# Patient Record
Sex: MALE | URBAN - METROPOLITAN AREA
[De-identification: names, ages, dates, MRNs, and addresses within clinical notes are randomized per-mention and may not be internally consistent; named-entity substitution may affect disease eponyms.]

---

## 2019-01-31 ENCOUNTER — OFFICE VISIT (OUTPATIENT)
Dept: URBAN - METROPOLITAN AREA CLINIC 40 | Facility: CLINIC | Age: 69
End: 2019-01-31
Payer: COMMERCIAL

## 2019-01-31 DIAGNOSIS — H43.813 BILATERAL VITREOUS DETACHMENT OF EYES: ICD-10-CM

## 2019-01-31 DIAGNOSIS — H25.813 COMBINED FORMS OF AGE-RELATED CATARACT, BILATERAL: Primary | ICD-10-CM

## 2019-01-31 PROCEDURE — 99204 OFFICE O/P NEW MOD 45 MIN: CPT | Performed by: OPHTHALMOLOGY

## 2019-01-31 RX ORDER — DUREZOL 0.5 MG/ML
0.05 % EMULSION OPHTHALMIC
Qty: 5 | Refills: 1 | Status: INACTIVE
Start: 2019-01-31 | End: 2019-02-27

## 2019-01-31 RX ORDER — BESIFLOXACIN 6 MG/ML
0.6 % SUSPENSION OPHTHALMIC
Qty: 5 | Refills: 1 | Status: INACTIVE
Start: 2019-01-31 | End: 2019-02-08

## 2019-01-31 ASSESSMENT — INTRAOCULAR PRESSURE
OS: 18
OD: 18

## 2019-01-31 ASSESSMENT — VISUAL ACUITY
OS: 20/20
OD: 20/25

## 2019-01-31 ASSESSMENT — KERATOMETRY
OS: 44.13
OD: 44.00

## 2019-01-31 NOTE — IMPRESSION/PLAN
Impression: Bilateral vitreous detachment of eyes: H43.813. Condition: new prob, no addtl w/u needed. Plan: Posterior vitreous detachment accounts for the patient's complaints. There is no evidence of retinal pathology. All signs and risks of retinal detachment and tears were discussed in detail. Patient instructed to call the office immediately if any symptoms noted. Recommend the patient return to office for follow up.

## 2019-09-03 ENCOUNTER — OFFICE VISIT (OUTPATIENT)
Dept: URBAN - METROPOLITAN AREA CLINIC 40 | Facility: CLINIC | Age: 69
End: 2019-09-03
Payer: COMMERCIAL

## 2019-09-03 PROCEDURE — 99214 OFFICE O/P EST MOD 30 MIN: CPT | Performed by: OPHTHALMOLOGY

## 2019-09-03 ASSESSMENT — VISUAL ACUITY
OS: 20/20
OD: 20/30

## 2019-09-03 ASSESSMENT — INTRAOCULAR PRESSURE
OS: 16
OD: 16

## 2019-09-03 NOTE — IMPRESSION/PLAN
Impression: Combined forms of age-related cataract, bilateral: H25.813. .  Visually significant, quality of life issue, could improve with surgery. Plan: Cataracts account for the patient's complaints. Discussed all risks, benefits, alternatives, procedures and recovery. Patient understands changing glasses will not improve vision. Patient desires to have surgery, recommend phacoemulsification with intraocular lens implant OD.  lvl 2 - pt wants toric IOL -  Re-evaluate OS for possible cataract surgery after OD is done.

## 2019-09-27 ENCOUNTER — PRE-OPERATIVE VISIT (OUTPATIENT)
Dept: URBAN - METROPOLITAN AREA CLINIC 23 | Facility: CLINIC | Age: 69
End: 2019-09-27
Payer: COMMERCIAL

## 2019-09-27 PROCEDURE — 92136 OPHTHALMIC BIOMETRY: CPT | Performed by: OPHTHALMOLOGY

## 2019-09-27 ASSESSMENT — PACHYMETRY
OS: 3.57
OD: 3.51
OS: 27.22
OD: 27.49

## 2019-09-30 ENCOUNTER — SURGERY (OUTPATIENT)
Dept: URBAN - METROPOLITAN AREA SURGERY 11 | Facility: SURGERY | Age: 69
End: 2019-09-30
Payer: COMMERCIAL

## 2019-09-30 PROCEDURE — 66984 XCAPSL CTRC RMVL W/O ECP: CPT | Performed by: OPHTHALMOLOGY

## 2019-10-01 ENCOUNTER — POST-OPERATIVE VISIT (OUTPATIENT)
Dept: URBAN - METROPOLITAN AREA CLINIC 40 | Facility: CLINIC | Age: 69
End: 2019-10-01

## 2019-10-01 DIAGNOSIS — Z09 ENCNTR FOR F/U EXAM AFT TRTMT FOR COND OTH THAN MALIG NEOPLM: Primary | ICD-10-CM

## 2019-10-01 ASSESSMENT — INTRAOCULAR PRESSURE: OD: 22

## 2019-10-08 ENCOUNTER — POST-OPERATIVE VISIT (OUTPATIENT)
Dept: URBAN - METROPOLITAN AREA CLINIC 40 | Facility: CLINIC | Age: 69
End: 2019-10-08

## 2019-10-08 ASSESSMENT — INTRAOCULAR PRESSURE: OD: 19

## 2019-10-21 ENCOUNTER — SURGERY (OUTPATIENT)
Dept: URBAN - METROPOLITAN AREA SURGERY 11 | Facility: SURGERY | Age: 69
End: 2019-10-21
Payer: COMMERCIAL

## 2019-10-21 PROCEDURE — 66984 XCAPSL CTRC RMVL W/O ECP: CPT | Performed by: OPHTHALMOLOGY

## 2019-10-22 ENCOUNTER — POST-OPERATIVE VISIT (OUTPATIENT)
Dept: URBAN - METROPOLITAN AREA CLINIC 23 | Facility: CLINIC | Age: 69
End: 2019-10-22

## 2019-10-22 ASSESSMENT — INTRAOCULAR PRESSURE
OD: 16
OS: 20

## 2019-11-05 ENCOUNTER — POST-OPERATIVE VISIT (OUTPATIENT)
Dept: URBAN - METROPOLITAN AREA CLINIC 40 | Facility: CLINIC | Age: 69
End: 2019-11-05

## 2019-11-05 ASSESSMENT — INTRAOCULAR PRESSURE
OS: 13
OD: 13

## 2019-11-19 ENCOUNTER — POST-OPERATIVE VISIT (OUTPATIENT)
Dept: URBAN - METROPOLITAN AREA CLINIC 40 | Facility: CLINIC | Age: 69
End: 2019-11-19

## 2019-11-19 ASSESSMENT — INTRAOCULAR PRESSURE
OS: 15
OD: 14

## 2020-03-04 ENCOUNTER — OFFICE VISIT (OUTPATIENT)
Dept: URBAN - METROPOLITAN AREA CLINIC 40 | Facility: CLINIC | Age: 70
End: 2020-03-04
Payer: COMMERCIAL

## 2020-03-04 PROCEDURE — 92014 COMPRE OPH EXAM EST PT 1/>: CPT | Performed by: OPTOMETRIST

## 2020-03-04 ASSESSMENT — INTRAOCULAR PRESSURE
OS: 16
OD: 16

## 2021-04-07 ENCOUNTER — OFFICE VISIT (OUTPATIENT)
Dept: URBAN - METROPOLITAN AREA CLINIC 40 | Facility: CLINIC | Age: 71
End: 2021-04-07
Payer: COMMERCIAL

## 2021-04-07 DIAGNOSIS — H26.493 OTHER SECONDARY CATARACT, BILATERAL: Primary | ICD-10-CM

## 2021-04-07 PROCEDURE — 99214 OFFICE O/P EST MOD 30 MIN: CPT | Performed by: OPTOMETRIST

## 2021-04-07 ASSESSMENT — VISUAL ACUITY
OD: 20/20
OS: 20/20

## 2021-04-07 ASSESSMENT — INTRAOCULAR PRESSURE
OS: 13
OD: 14

## 2022-03-04 ENCOUNTER — OFFICE VISIT (OUTPATIENT)
Dept: URBAN - METROPOLITAN AREA CLINIC 41 | Facility: CLINIC | Age: 72
End: 2022-03-04
Payer: COMMERCIAL

## 2022-03-04 DIAGNOSIS — Z96.1 PRESENCE OF INTRAOCULAR LENS: ICD-10-CM

## 2022-03-04 PROCEDURE — 99204 OFFICE O/P NEW MOD 45 MIN: CPT | Performed by: OPHTHALMOLOGY

## 2022-03-04 PROCEDURE — 92134 CPTRZ OPH DX IMG PST SGM RTA: CPT | Performed by: OPHTHALMOLOGY

## 2022-03-04 RX ORDER — OFLOXACIN 3 MG/ML
0.3 % SOLUTION/ DROPS OPHTHALMIC
Qty: 5 | Refills: 0 | Status: INACTIVE
Start: 2022-03-04 | End: 2022-03-25

## 2022-03-04 RX ORDER — PREDNISOLONE ACETATE 10 MG/ML
1 % SUSPENSION/ DROPS OPHTHALMIC
Qty: 5 | Refills: 3 | Status: INACTIVE
Start: 2022-03-04 | End: 2022-03-25

## 2022-03-04 ASSESSMENT — INTRAOCULAR PRESSURE
OS: 8
OD: 9

## 2022-03-04 NOTE — IMPRESSION/PLAN
Impression: Other vitreous opacities, bilateral: H43.393. Plan: Exam and OCT demonstrates vitreous opacities that are hindering the patient's ADLs. The risks and benefits of PPV versus observation were discussed at length. Given the patient's current visual acuity and hindrance on activities of daily living, the patient elects to proceed with surgery.   

Surgery 25g PPV/posterior capsulotomy/poss EL Left eye detailed exam

## 2022-03-04 NOTE — IMPRESSION/PLAN
Impression: Vitreous degeneration, bilateral: H43.813. Plan: Patient notes floaters. Exam demonstrates a PVD without any RD or RT on exam.  Discussed R/B/A of PPV vs observation for the floaters. Reassurance provided. RDW discussed. Precautions discussed with the patient.

## 2022-03-04 NOTE — IMPRESSION/PLAN
Impression: Presence of intraocular lens: Z96.1.  Plan: Looks great. + PCO; will proceed with capsulotomy at the time of PPV

## 2022-03-08 ENCOUNTER — Encounter (OUTPATIENT)
Dept: URBAN - METROPOLITAN AREA EXTERNAL CLINIC 14 | Facility: EXTERNAL CLINIC | Age: 72
End: 2022-03-08
Payer: COMMERCIAL

## 2022-03-08 PROCEDURE — 67039 LASER TREATMENT OF RETINA: CPT | Performed by: OPHTHALMOLOGY

## 2022-03-09 ENCOUNTER — POST-OPERATIVE VISIT (OUTPATIENT)
Dept: URBAN - METROPOLITAN AREA CLINIC 41 | Facility: CLINIC | Age: 72
End: 2022-03-09
Payer: MEDICARE

## 2022-03-09 DIAGNOSIS — H43.393 OTHER VITREOUS OPACITIES, BILATERAL: Primary | ICD-10-CM

## 2022-03-09 PROCEDURE — 99024 POSTOP FOLLOW-UP VISIT: CPT | Performed by: OPHTHALMOLOGY

## 2022-03-09 ASSESSMENT — INTRAOCULAR PRESSURE
OS: 9
OD: 13

## 2022-03-09 NOTE — IMPRESSION/PLAN
Impression: S/P 25G PPV/POSTERIOR CAPSULOTOMY/POSSIBLE EL OS - 1 Day. Other vitreous opacities, bilateral  H43.393. Plan: Excellent post op course. Post operative instructions reviewed. Condition is improving. No signs or symptoms of infection.  IOP normal.

RTC: 1-2 week POS OS, OCT OU, Dilate OU as he may want to discuss tx for floaters OD

## 2022-03-25 ENCOUNTER — POST-OPERATIVE VISIT (OUTPATIENT)
Dept: URBAN - METROPOLITAN AREA CLINIC 41 | Facility: CLINIC | Age: 72
End: 2022-03-25
Payer: MEDICARE

## 2022-03-25 PROCEDURE — 99024 POSTOP FOLLOW-UP VISIT: CPT | Performed by: OPHTHALMOLOGY

## 2022-03-25 RX ORDER — PREDNISOLONE ACETATE 10 MG/ML
1 % SUSPENSION/ DROPS OPHTHALMIC
Qty: 5 | Refills: 3 | Status: INACTIVE
Start: 2022-03-25 | End: 2022-06-22

## 2022-03-25 RX ORDER — OFLOXACIN 3 MG/ML
0.3 % SOLUTION/ DROPS OPHTHALMIC
Qty: 5 | Refills: 0 | Status: INACTIVE
Start: 2022-03-25 | End: 2022-04-23

## 2022-03-25 ASSESSMENT — INTRAOCULAR PRESSURE
OD: 12
OS: 15

## 2022-03-25 NOTE — IMPRESSION/PLAN
Impression: 1) S/P 25G PPV/POSTERIOR CAPSULOTOMY/EL OS - 17 Days. Other vitreous opacities, bilateral  H43.393.
2) Other vitreous opacities OD- Plan: 1) Excellent post op course. Post operative instructions reviewed. Condition is improving. 2) Patient notes that opacities OD are visually significant. Discussed surgery vs observation. Patient elects to proceed with vitrectomy surgery OD.  

RTC 25gPPV/poss EL OD x vit opacities

## 2022-03-29 ENCOUNTER — Encounter (OUTPATIENT)
Dept: URBAN - METROPOLITAN AREA EXTERNAL CLINIC 14 | Facility: EXTERNAL CLINIC | Age: 72
End: 2022-03-29
Payer: COMMERCIAL

## 2022-03-29 PROCEDURE — 67036 REMOVAL OF INNER EYE FLUID: CPT | Performed by: OPHTHALMOLOGY

## 2022-03-30 ENCOUNTER — POST-OPERATIVE VISIT (OUTPATIENT)
Dept: URBAN - METROPOLITAN AREA CLINIC 41 | Facility: CLINIC | Age: 72
End: 2022-03-30
Payer: MEDICARE

## 2022-03-30 PROCEDURE — 99024 POSTOP FOLLOW-UP VISIT: CPT | Performed by: OPHTHALMOLOGY

## 2022-03-30 ASSESSMENT — INTRAOCULAR PRESSURE
OD: 11
OS: 18

## 2022-03-30 NOTE — IMPRESSION/PLAN
Impression: S/P Status post 25G PPV/POSTERIOR CAPSULOTOMY/EL OD - 1 Day. Other vitreous opacities, bilateral  H43.393.  Excellent post op course   Post operative instructions reviewed - Condition is improving - Plan: --retina attached
--no s/s infection
--IOP acceptable
--start PF/Oflox QID
--RD/endoph WS discussed
--f/u 1 week PO OD

## 2022-04-04 ENCOUNTER — POST-OPERATIVE VISIT (OUTPATIENT)
Dept: URBAN - METROPOLITAN AREA CLINIC 41 | Facility: CLINIC | Age: 72
End: 2022-04-04
Payer: MEDICARE

## 2022-04-04 PROCEDURE — 99024 POSTOP FOLLOW-UP VISIT: CPT | Performed by: OPHTHALMOLOGY

## 2022-04-04 ASSESSMENT — INTRAOCULAR PRESSURE
OD: 19
OS: 15

## 2022-04-04 NOTE — IMPRESSION/PLAN
Impression: S/P 25G PPV/POSTERIOR CAPSULOTOMY/EL OD - 6 Days. Other vitreous opacities, bilateral  H43.393. Plan: --Excellent post op course
--Condition is improving 
--Taper Prednisolone acetate 1% TID x 1 wk, BID x 1wk, QD x 1wk, then stop
--Discontinue Ofloxacin 0.3% RTC: 3 mo with OCT OU

## 2022-06-27 ENCOUNTER — POST-OPERATIVE VISIT (OUTPATIENT)
Dept: URBAN - METROPOLITAN AREA CLINIC 41 | Facility: CLINIC | Age: 72
End: 2022-06-27
Payer: MEDICARE

## 2022-06-27 DIAGNOSIS — H43.393 OTHER VITREOUS OPACITIES, BILATERAL: Primary | ICD-10-CM

## 2022-06-27 PROCEDURE — 92134 CPTRZ OPH DX IMG PST SGM RTA: CPT | Performed by: OPHTHALMOLOGY

## 2022-06-27 PROCEDURE — 99024 POSTOP FOLLOW-UP VISIT: CPT | Performed by: OPHTHALMOLOGY

## 2022-06-27 ASSESSMENT — INTRAOCULAR PRESSURE
OS: 15
OD: 15

## 2022-06-27 NOTE — IMPRESSION/PLAN
Impression: S/P 25G PPV/POSTERIOR CAPSULOTOMY/EL OD - 90 Days. Other vitreous opacities, bilateral  H43.393. Plan: --Excellent post op course   
--Post operative instructions reviewed 
--Condition is improving . RTC: 12 mo with OCT OU

## 2023-06-28 ENCOUNTER — OFFICE VISIT (OUTPATIENT)
Facility: LOCATION | Age: 73
End: 2023-06-28
Payer: COMMERCIAL

## 2023-06-28 DIAGNOSIS — H43.393 OTHER VITREOUS OPACITIES, BILATERAL: Primary | ICD-10-CM

## 2023-06-28 DIAGNOSIS — Z96.1 PRESENCE OF INTRAOCULAR LENS: ICD-10-CM

## 2023-06-28 PROCEDURE — 92014 COMPRE OPH EXAM EST PT 1/>: CPT | Performed by: OPHTHALMOLOGY

## 2023-06-28 PROCEDURE — 92134 CPTRZ OPH DX IMG PST SGM RTA: CPT | Performed by: OPHTHALMOLOGY

## 2023-06-28 ASSESSMENT — INTRAOCULAR PRESSURE
OD: 12
OS: 12

## 2023-06-28 NOTE — IMPRESSION/PLAN
Impression: Other vitreous opacities, bilateral  H43.393.
s/p 25G PPV/POSTERIOR CAPSULOTOMY/EL OD (03/29/2022)-SI Plan: Looks great